# Patient Record
Sex: FEMALE | Race: WHITE | NOT HISPANIC OR LATINO | Employment: FULL TIME | URBAN - METROPOLITAN AREA
[De-identification: names, ages, dates, MRNs, and addresses within clinical notes are randomized per-mention and may not be internally consistent; named-entity substitution may affect disease eponyms.]

---

## 2024-12-13 ENCOUNTER — APPOINTMENT (OUTPATIENT)
Dept: UROLOGY | Facility: CLINIC | Age: 34
End: 2024-12-13
Payer: COMMERCIAL

## 2024-12-13 VITALS
HEART RATE: 93 BPM | SYSTOLIC BLOOD PRESSURE: 114 MMHG | WEIGHT: 126 LBS | TEMPERATURE: 97.9 F | HEIGHT: 62 IN | DIASTOLIC BLOOD PRESSURE: 73 MMHG | BODY MASS INDEX: 23.19 KG/M2

## 2024-12-13 DIAGNOSIS — N39.0 RECURRENT UTI: ICD-10-CM

## 2024-12-13 DIAGNOSIS — Z30.9 ENCOUNTER FOR CONTRACEPTIVE MANAGEMENT, UNSPECIFIED TYPE: ICD-10-CM

## 2024-12-13 DIAGNOSIS — N39.0 URINARY TRACT INFECTION WITHOUT HEMATURIA, SITE UNSPECIFIED: Primary | ICD-10-CM

## 2024-12-13 LAB
POC APPEARANCE, URINE: CLEAR
POC BILIRUBIN, URINE: NEGATIVE
POC BLOOD, URINE: NEGATIVE
POC COLOR, URINE: YELLOW
POC GLUCOSE, URINE: NEGATIVE MG/DL
POC KETONES, URINE: NEGATIVE MG/DL
POC LEUKOCYTES, URINE: NEGATIVE
POC NITRITE,URINE: NEGATIVE
POC PH, URINE: 6 PH
POC PROTEIN, URINE: NEGATIVE MG/DL
POC SPECIFIC GRAVITY, URINE: <=1.005
POC UROBILINOGEN, URINE: 0.2 EU/DL

## 2024-12-13 PROCEDURE — 1036F TOBACCO NON-USER: CPT | Performed by: OBSTETRICS & GYNECOLOGY

## 2024-12-13 PROCEDURE — 3008F BODY MASS INDEX DOCD: CPT | Performed by: OBSTETRICS & GYNECOLOGY

## 2024-12-13 PROCEDURE — 87086 URINE CULTURE/COLONY COUNT: CPT

## 2024-12-13 PROCEDURE — 81003 URINALYSIS AUTO W/O SCOPE: CPT | Performed by: OBSTETRICS & GYNECOLOGY

## 2024-12-13 PROCEDURE — 99204 OFFICE O/P NEW MOD 45 MIN: CPT | Performed by: OBSTETRICS & GYNECOLOGY

## 2024-12-13 RX ORDER — DESOGESTREL AND ETHINYL ESTRADIOL 0.15-0.03
1 KIT ORAL DAILY
Qty: 84 TABLET | Refills: 3 | Status: SHIPPED | OUTPATIENT
Start: 2024-12-13 | End: 2025-12-13

## 2024-12-13 RX ORDER — NORETHINDRONE 0.35 MG/1
1 TABLET ORAL DAILY
COMMUNITY

## 2024-12-13 ASSESSMENT — PAIN SCALES - GENERAL: PAINLEVEL_OUTOF10: 0-NO PAIN

## 2024-12-13 NOTE — PROGRESS NOTES
"INITIAL EVALUATION       HISTORY OF PRESENT ILLNESS:   Kalee Yan is a 34 y.o. female who presents to me today for evaluation of Abdulaziz's. The patient reports being prone to UTI's since approximately 17 years old. The patient states these became more prominent after having a kidney stone in 2020, she has tried stopping birth control as well as drinking lemon water daily which helped for approximately 2 years. The patient then gave birth to her son on 6/30/24 and notes UTI's every other week since the beginning of July this year. The patient is currently on Micronor for contraception and is wondering if she is sensitive to UTI's due to being on a form of contraception however would not like to give this up due to not wanting to try for conception again for at least 2-3 more years. Patient notes d-mannose does help with her UTI prevention.     Patient notes she had a second grade tear during her delivery in June. Since then, she endorses coitus with her partner being \"not extremely painful but feels different.\"     PAST MEDICAL HISTORY:  No past medical history on file.    PAST SURGICAL HISTORY:  No past surgical history on file.     ALLERGIES:   No Known Allergies     MEDICATIONS:   Medication Documentation Review Audit       Reviewed by Alexa Schafer MA (Medical Assistant) on 12/13/24 at 1430      Medication Order Taking? Sig Documenting Provider Last Dose Status   norethindrone (Micronor) 0.35 mg tablet [id number]  Take 1 tablet (0.35 mg) by mouth once daily. Historical Provider, MD  Active                     SOCIAL HISTORY:  Patient  reports that she has never smoked. She has never used smokeless tobacco. She reports that she does not currently use alcohol. She reports that she does not use drugs.   Social History     Socioeconomic History    Marital status:      Spouse name: Not on file    Number of children: Not on file    Years of education: Not on file    Highest education level: Not on file " "  Occupational History    Not on file   Tobacco Use    Smoking status: Never    Smokeless tobacco: Never   Substance and Sexual Activity    Alcohol use: Not Currently    Drug use: Never    Sexual activity: Not on file   Other Topics Concern    Not on file   Social History Narrative    Not on file     Social Drivers of Health     Financial Resource Strain: Not on file   Food Insecurity: Not on file   Transportation Needs: Not on file   Physical Activity: Not on file   Stress: Not on file   Social Connections: Not on file   Intimate Partner Violence: Not on file   Housing Stability: Not on file       FAMILY HISTORY:  No family history on file.    REVIEW OF SYSTEMS:  Constitutional: Negative for fever and chills. Denies anorexia, weight loss.  Eyes: Negative for visual disturbance.   Respiratory: Negative for shortness of breath.    Cardiovascular: Negative for chest pain.   Gastrointestinal: Negative for nausea and vomiting.   Genitourinary: See interval history above.  Skin: Negative for rash.   Neurological: Negative for dizziness and numbness.   Psychiatric/Behavioral: Negative for confusion and decreased concentration.     PHYSICAL EXAM:  Blood pressure 114/73, pulse 93, temperature 36.6 °C (97.9 °F), height 1.575 m (5' 2\"), weight 57.2 kg (126 lb).  Constitutional: Patient appears well-developed and well-nourished. No distress.    Head: Normocephalic and atraumatic.    Neck: Normal range of motion.    Cardiovascular: Normal rate.    Pulmonary/Chest: Effort normal. No respiratory distress.   :   Musculoskeletal: Normal range of motion.    Neurological: Alert and oriented to person, place, and time.  Psychiatric: Normal mood and affect. Behavior is normal. Thought content normal.     Urine dipstick shows negative for all components.  Micro exam: not done.     Assessment:      1. Urinary tract infection without hematuria, site unspecified  POCT UA Automated manually resulted      2. Recurrent UTI  Urine culture    " CT abdomen pelvis wo IV contrast    Urine culture      3. Encounter for contraceptive management, unspecified type            Kalee Yan is a 34 y.o. female with Abdulaziz and possible IC.    We discussed the general work-up for recurrent UTI including renal imaging and cystoscopy. Also recommend regular use of d-mannose prevention .     If needed, can trial low-dose antibiotic suppression for 3 months.     I recommend that she look at the website ichelp.com to identify triggers for IC flares in order to learn how to avoid them in her daily life (e.g. alcohol, caffeine, etc.) We also discuss the treatment for IC including antihistamines, anticholinergics, bladder instillations and hydrodistention, and that we will start with the least invasive and the treatment with least side effects and go from there.       Plan:   Order CT stone protocol.   Urine culture collected.   Standing urine culture ordered.   Patient will be scheduled for a cystoscopy at the patient's earliest convenience.   Continue with d-mannose therapy.  Prescription for Apri sent to the patient's pharmacy.    Provided samples of uber lube.       Chelsey Bernal MD MPH    Scribe Attestation  By signing my name below, I, Josselyn Leon   attest that this documentation has been prepared under the direction and in the presence of Chelsey Bernal MD MPH.

## 2024-12-15 LAB — BACTERIA UR CULT: NORMAL

## 2025-01-30 ENCOUNTER — APPOINTMENT (OUTPATIENT)
Dept: UROLOGY | Facility: CLINIC | Age: 35
End: 2025-01-30
Payer: COMMERCIAL

## 2025-03-20 ENCOUNTER — APPOINTMENT (OUTPATIENT)
Dept: UROLOGY | Facility: CLINIC | Age: 35
End: 2025-03-20
Payer: MEDICAID